# Patient Record
(demographics unavailable — no encounter records)

---

## 2025-02-28 NOTE — HISTORY OF PRESENT ILLNESS
[Postpartum Follow Up] : postpartum follow up [Complications:___] : no complications [Delivery Date: ___] : on [unfilled] [Primary C/S] : delivered by  section [Male] : Delivery History: baby boy [Wt. ___] : weighing [unfilled] [Breastfeeding] : currently nursing [S/Sx PP Depression] : signs/symptoms of postpartum depression [None] : No associated symptoms are reported [Clean/Dry/Intact] : clean, dry and intact [Erythema] : not erythematous [Swelling] : not swollen [Dehiscence] : not dehisced [Examination Of The Breasts] : breasts are normal [Doing Well] : is doing well [de-identified] : pt states "thought of self harm crossed my mind the first night in hospital, but nothing since then" denies SI/HI at thsi time. states she has baseline anxiety which is under control with therapy and medication  [de-identified] : steristrips removed  [de-identified] : hx of anxiety - partner present states "erick's anxiety is no different from is baseline at this moment"   [de-identified] : continue seeing therapist weekly  schedule appt with PCP who manages prozac scar desisitization exercizes reviewed  discussing importance of contraception after c/s to prevent short interval pregnancy, will discuss further at next visit.  RTC 3 weeks

## 2025-03-20 NOTE — HISTORY OF PRESENT ILLNESS
[Postpartum Follow Up] : postpartum follow up [Primary C/S] : delivered by  section [Male] : Delivery History: baby boy [Wt. ___] : weighing [unfilled] [Rhogam] : Rhogam was not administered [Rubella Vaccine] : Rubella vaccine was not administered [Pertussis Vaccine] : Pertussis vaccine was not administered [BTL] : no tubal ligation [Breastfeeding] : currently nursing [BF with Difficulty] : nursing without difficulty [Resumed Menses] : has not resumed her menses [Resumed [place]] : has not resumed intercourse [Intended Contraception] : Intended Contraception: [Condoms] : condoms [Abdominal Pain] : no abdominal pain [Back Pain] : no back pain [Breast Pain] : no breast pain [BreastFeeding Problems] : no breastfeeding problems [Chest Pain] : no chest pain [Cracked Nipples] : no cracked nipples [S/Sx PP Depression] : signs/symptoms of postpartum depression [[place] Depression Scale ___ (0-30)] : [unfilled] [Heavy Bleeding] : no heavy bleeding [Incisional Drainage] : no incisional drainage [Incisional Pain] : no incisional pain [Irregular Bleeding] : no irregular bleeding [Leg Pain] : no leg pain [Shortness of Breath] : no shortness of breath [Suicidal Ideation] : no suicidal ideation [Vaginal Discharge] : no vaginal discharge [Clean/Dry/Intact] : clean, dry and intact [Erythema] : not erythematous [Swelling] : not swollen [Dehiscence] : not dehisced [Healed] : healed [Back to Normal] : is back to normal in size [Normal] : the vagina was normal [Examination Of The Breasts] : breasts are normal [Doing Well] : is doing well [No Sign of Infection] : is showing no signs of infection [None] : None [FreeTextEntry8] : Ptpresents for 6week PP visit. Feeling well. Has noticed vaginal odor but otherwise no concers. Pt is breast pumping and supplementing with formula. Reports she has a lot of support at home. States she is an axious person normal but definitely feels like she is in a good place and is doing well. Sees therapist weekly and is currently on Lexapro. Denies depression [de-identified] : Pt exclusively breast pumping, and supplementing with formula. Has had regular visits with lactation. [de-identified] : - Vaginal cultures collected per pt request. Pt reassured of normal vaginal exam. - Contraceptive options reviewed, safe sex practices encouraged, pt advised may resume intercourse. Pt will use condoms for now. Will consider IUD once she has stopped breastfeeding - Easing back into exercise discussed - S/S of mastitis reviewed - When to call reviewed - RTO 3-4 months for annual visit or sooner PRN

## 2025-06-10 NOTE — PLAN
[FreeTextEntry1] : - ASSCP/ACOG guideline for pap smear discussed with patient, pap not due at this time  - Reassured pt that postpartum hair loss is a normal, declines TFTs today,  temporary process but recommended f/u with PCP to r/o thyroid dysfunction  - Continue pelvic floor PT; has internal assessment this week.  - Provided anticipatory guidance for IUD insertion and discussed postpartum timing; pt will f/u when ready  - Discussed safety of Xanax use prior to IUD placement; explained limited data of use while breastfeeding - Discussed strategies for maintaining milk supply and protected pumping rights and workplace accomodation - Breast self-awareness discussed - RTO in one year or soon PRN  Written by Chelle Luna, midwife student

## 2025-06-10 NOTE — COUNSELING
[Nutrition/ Exercise/ Weight Management] : nutrition, exercise, weight management [Body Image] : body image [Sunscreen] : sunscreen [Vitamins/Supplements] : vitamins/supplements [Drugs] : drugs [Breast Self Exam] : breast self exam [Bladder Hygiene] : bladder hygiene [Contraception/ Emergency Contraception/ Safe Sexual Practices] : contraception, emergency contraception, safe sexual practices [Preconception Care/ Fertility options] : preconception care, fertility options [STD (testing, results, tx)] : STD (testing, results, tx) [Confidentiality] : confidentiality [Lab Results] : lab results [Medication Management] : medication management

## 2025-06-10 NOTE — HISTORY OF PRESENT ILLNESS
[Patient reported PAP Smear was normal] : Patient reported PAP Smear was normal [LMP unknown] : LMP unknown [N] : Patient reports normal menses [Condoms] : uses condoms [Monogamous (Male Partner)] : is monogamous with a male partner [Y] : Positive pregnancy history [Safe Sex] : uses safe sex precautions [HIV test declined] : HIV test declined [Syphilis test declined] : Syphilis test declined [Gonorrhea test declined] : Gonorrhea test declined [Chlamydia test declined] : Chlamydia test declined [Trichomonas test declined] : Trichomonas test declined [HPV test declined] : HPV test declined [Hepatitis B test declined] : Hepatitis B test declined [Hepatitis C test declined] : Hepatitis C test declined [FreeTextEntry1] : 32 y/o  presents for routine annual GYN visit. Currently breastfeeding and expresses concern about returning to work at the end of the month due to uncertainty around maintaining a consistent pumping schedule.  She reports dyspareunia . Lubrication has not provided relief. She is currently working with a pelvic floor PT.  She also reports postpartum hair loss, describing it as falling out "in chunks". Reassured this can be common part of the postpartum transition, but discussed the option of  TFT today or f/u with PCP for a thyroid panel to r/o additional causes. Pt is interested in initiating IUD for contraception but feels anxious about the procedure. She mentioned wanting to take Xanax beforehand to help manage anxiety but is hesistant due to breastfeeding. Currently using condoms as form of contraception. Denies pelvic pain, abnormal bleeding, vaginal discharge. No bowel or bladder complaints. Feels well overall.   OB hx: 25, boy "Gil", 8#12oz, c/s  GYN hx: h/o abnl pap and ovarian cysts. 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 works in commercial insurance, denies toxic habits   [PapSmeardate] : 04/2024 [PGxTotal] : 1 [[facility]] : 1 [PGHxPremature] : 0 [PGHxAbortions] : 0 [[facility]] : [facility] [PGHxABInduced] : 0 [PGHxABSpont] : 0 [PGHxEctopic] : 0 [PGHxMultBirths] : 0

## 2025-06-10 NOTE — PHYSICAL EXAM
[Appropriately responsive] : appropriately responsive [Alert] : alert [No Acute Distress] : no acute distress [No Lymphadenopathy] : no lymphadenopathy [Soft] : soft [Non-tender] : non-tender [No HSM] : No HSM [Non-distended] : non-distended [No Lesions] : no lesions [No Mass] : no mass [Oriented x3] : oriented x3 [Examination Of The Breasts] : a normal appearance [No Masses] : no breast masses were palpable [Labia Minora] : normal [Labia Majora] : normal [Normal] : normal [Uterine Adnexae] : normal [Declined] : Patient declined rectal exam [Chaperone Declined] : Chaperone offered however refused by patient, [FreeTextEntry8] : WNL